# Patient Record
Sex: MALE | Race: WHITE | NOT HISPANIC OR LATINO | Employment: OTHER | URBAN - METROPOLITAN AREA
[De-identification: names, ages, dates, MRNs, and addresses within clinical notes are randomized per-mention and may not be internally consistent; named-entity substitution may affect disease eponyms.]

---

## 2021-03-30 ENCOUNTER — TELEPHONE (OUTPATIENT)
Dept: NEUROSURGERY | Facility: CLINIC | Age: 61
End: 2021-03-30

## 2021-04-06 ENCOUNTER — TELEPHONE (OUTPATIENT)
Dept: NEUROSURGERY | Facility: CLINIC | Age: 61
End: 2021-04-06

## 2021-04-07 ENCOUNTER — OFFICE VISIT (OUTPATIENT)
Dept: NEUROSURGERY | Facility: CLINIC | Age: 61
End: 2021-04-07
Payer: OTHER GOVERNMENT

## 2021-04-07 VITALS
HEART RATE: 67 BPM | SYSTOLIC BLOOD PRESSURE: 121 MMHG | DIASTOLIC BLOOD PRESSURE: 82 MMHG | WEIGHT: 227.06 LBS | BODY MASS INDEX: 33.53 KG/M2

## 2021-04-07 DIAGNOSIS — M47.22 CERVICAL SPONDYLOSIS WITH RADICULOPATHY: ICD-10-CM

## 2021-04-07 DIAGNOSIS — M50.20 HERNIATED CERVICAL INTERVERTEBRAL DISC: Primary | ICD-10-CM

## 2021-04-07 PROCEDURE — 99204 PR OFFICE/OUTPT VISIT, NEW, LEVL IV, 45-59 MIN: ICD-10-PCS | Mod: S$PBB,,, | Performed by: NEUROLOGICAL SURGERY

## 2021-04-07 PROCEDURE — 99999 PR PBB SHADOW E&M-EST. PATIENT-LVL IV: CPT | Mod: PBBFAC,,, | Performed by: NEUROLOGICAL SURGERY

## 2021-04-07 PROCEDURE — 99214 OFFICE O/P EST MOD 30 MIN: CPT | Mod: PBBFAC | Performed by: NEUROLOGICAL SURGERY

## 2021-04-07 PROCEDURE — 99204 OFFICE O/P NEW MOD 45 MIN: CPT | Mod: S$PBB,,, | Performed by: NEUROLOGICAL SURGERY

## 2021-04-07 PROCEDURE — 99999 PR PBB SHADOW E&M-EST. PATIENT-LVL IV: ICD-10-PCS | Mod: PBBFAC,,, | Performed by: NEUROLOGICAL SURGERY

## 2021-04-14 ENCOUNTER — TELEPHONE (OUTPATIENT)
Dept: PAIN MEDICINE | Facility: CLINIC | Age: 61
End: 2021-04-14

## 2021-04-26 ENCOUNTER — TELEPHONE (OUTPATIENT)
Dept: PAIN MEDICINE | Facility: CLINIC | Age: 61
End: 2021-04-26

## 2021-04-27 ENCOUNTER — TELEPHONE (OUTPATIENT)
Dept: PAIN MEDICINE | Facility: CLINIC | Age: 61
End: 2021-04-27

## 2021-04-28 DIAGNOSIS — M47.22 CERVICAL SPONDYLOSIS WITH RADICULOPATHY: Primary | ICD-10-CM

## 2021-04-29 ENCOUNTER — TELEPHONE (OUTPATIENT)
Dept: PAIN MEDICINE | Facility: CLINIC | Age: 61
End: 2021-04-29

## 2021-05-04 ENCOUNTER — TELEPHONE (OUTPATIENT)
Dept: NEUROSURGERY | Facility: CLINIC | Age: 61
End: 2021-05-04

## 2021-05-06 PROBLEM — M54.12 CERVICAL RADICULOPATHY: Status: ACTIVE | Noted: 2021-05-06

## 2021-05-10 ENCOUNTER — TELEPHONE (OUTPATIENT)
Dept: PAIN MEDICINE | Facility: CLINIC | Age: 61
End: 2021-05-10

## 2021-05-10 DIAGNOSIS — M47.22 CERVICAL SPONDYLOSIS WITH RADICULOPATHY: Primary | ICD-10-CM

## 2021-05-10 DIAGNOSIS — Z11.59 SCREENING FOR VIRAL DISEASE: ICD-10-CM

## 2021-05-20 ENCOUNTER — TELEPHONE (OUTPATIENT)
Dept: NEUROSURGERY | Facility: CLINIC | Age: 61
End: 2021-05-20

## 2021-06-01 ENCOUNTER — TELEPHONE (OUTPATIENT)
Dept: PAIN MEDICINE | Facility: CLINIC | Age: 61
End: 2021-06-01

## 2021-06-03 ENCOUNTER — TELEPHONE (OUTPATIENT)
Dept: PAIN MEDICINE | Facility: CLINIC | Age: 61
End: 2021-06-03

## 2021-06-03 DIAGNOSIS — M54.12 CERVICAL RADICULOPATHY: Primary | ICD-10-CM

## 2021-06-15 ENCOUNTER — TELEPHONE (OUTPATIENT)
Dept: PAIN MEDICINE | Facility: CLINIC | Age: 61
End: 2021-06-15

## 2021-06-16 ENCOUNTER — TELEPHONE (OUTPATIENT)
Dept: PAIN MEDICINE | Facility: CLINIC | Age: 61
End: 2021-06-16

## 2021-06-24 ENCOUNTER — TELEPHONE (OUTPATIENT)
Dept: PAIN MEDICINE | Facility: CLINIC | Age: 61
End: 2021-06-24

## 2021-09-14 ENCOUNTER — TELEPHONE (OUTPATIENT)
Dept: NEUROSURGERY | Facility: CLINIC | Age: 61
End: 2021-09-14

## 2021-09-22 ENCOUNTER — OFFICE VISIT (OUTPATIENT)
Dept: NEUROSURGERY | Facility: CLINIC | Age: 61
End: 2021-09-22
Payer: OTHER GOVERNMENT

## 2021-09-22 ENCOUNTER — TELEPHONE (OUTPATIENT)
Dept: NEUROSURGERY | Facility: CLINIC | Age: 61
End: 2021-09-22

## 2021-09-22 VITALS
DIASTOLIC BLOOD PRESSURE: 92 MMHG | HEART RATE: 82 BPM | BODY MASS INDEX: 32.58 KG/M2 | SYSTOLIC BLOOD PRESSURE: 137 MMHG | WEIGHT: 227.06 LBS

## 2021-09-22 DIAGNOSIS — M47.22 CERVICAL SPONDYLOSIS WITH RADICULOPATHY: Primary | ICD-10-CM

## 2021-09-22 DIAGNOSIS — M47.22 CERVICAL SPONDYLOSIS WITH RADICULOPATHY: ICD-10-CM

## 2021-09-22 DIAGNOSIS — M50.20 HERNIATED CERVICAL INTERVERTEBRAL DISC: ICD-10-CM

## 2021-09-22 DIAGNOSIS — M50.20 HERNIATED CERVICAL INTERVERTEBRAL DISC: Primary | ICD-10-CM

## 2021-09-22 PROCEDURE — 99999 PR PBB SHADOW E&M-EST. PATIENT-LVL III: ICD-10-PCS | Mod: PBBFAC,,, | Performed by: NEUROLOGICAL SURGERY

## 2021-09-22 PROCEDURE — 99213 OFFICE O/P EST LOW 20 MIN: CPT | Mod: PBBFAC | Performed by: NEUROLOGICAL SURGERY

## 2021-09-22 PROCEDURE — 99999 PR PBB SHADOW E&M-EST. PATIENT-LVL III: CPT | Mod: PBBFAC,,, | Performed by: NEUROLOGICAL SURGERY

## 2021-09-22 PROCEDURE — 99214 PR OFFICE/OUTPT VISIT, EST, LEVL IV, 30-39 MIN: ICD-10-PCS | Mod: S$PBB,,, | Performed by: NEUROLOGICAL SURGERY

## 2021-09-22 PROCEDURE — 99214 OFFICE O/P EST MOD 30 MIN: CPT | Mod: S$PBB,,, | Performed by: NEUROLOGICAL SURGERY

## 2021-09-23 ENCOUNTER — TELEPHONE (OUTPATIENT)
Dept: NEUROSURGERY | Facility: CLINIC | Age: 61
End: 2021-09-23

## 2021-09-24 ENCOUNTER — TELEPHONE (OUTPATIENT)
Dept: NEUROSURGERY | Facility: CLINIC | Age: 61
End: 2021-09-24

## 2021-10-04 ENCOUNTER — TELEPHONE (OUTPATIENT)
Dept: NEUROSURGERY | Facility: CLINIC | Age: 61
End: 2021-10-04

## 2021-10-05 ENCOUNTER — TELEPHONE (OUTPATIENT)
Dept: NEUROSURGERY | Facility: CLINIC | Age: 61
End: 2021-10-05

## 2021-10-07 ENCOUNTER — TELEPHONE (OUTPATIENT)
Dept: NEUROSURGERY | Facility: CLINIC | Age: 61
End: 2021-10-07

## 2021-10-25 ENCOUNTER — TELEPHONE (OUTPATIENT)
Dept: NEUROSURGERY | Facility: CLINIC | Age: 61
End: 2021-10-25
Payer: OTHER GOVERNMENT

## 2021-11-03 ENCOUNTER — TELEPHONE (OUTPATIENT)
Dept: NEUROSURGERY | Facility: CLINIC | Age: 61
End: 2021-11-03
Payer: OTHER GOVERNMENT

## 2021-11-03 DIAGNOSIS — M50.00 INTERVERTEBRAL CERVICAL DISC DISORDER WITH MYELOPATHY, CERVICAL REGION: Primary | ICD-10-CM

## 2021-11-30 ENCOUNTER — TELEPHONE (OUTPATIENT)
Dept: NEUROSURGERY | Facility: CLINIC | Age: 61
End: 2021-11-30
Payer: OTHER GOVERNMENT

## 2022-04-28 DIAGNOSIS — U07.1 COVID-19 VIRUS DETECTED: ICD-10-CM

## 2022-08-23 ENCOUNTER — TELEPHONE (OUTPATIENT)
Dept: NEUROSURGERY | Facility: CLINIC | Age: 62
End: 2022-08-23
Payer: OTHER GOVERNMENT

## 2022-08-23 NOTE — TELEPHONE ENCOUNTER
Spoke with patient wife and have been told that they prefer to have surgery closer to home ?   Patient lives currently in [facility] .   Will speak with Ms Vivian for advisement and contact tomorrow as instructed.   Patient wife is aware and seems happy with plan.     ----- Message from Meena Ramos sent at 8/23/2022  2:33 PM CDT -----  Regarding: Appt  Contact: Wife @ [phone]  Message is for Lizeth, missed call and asking to a return call to get appt

## 2022-08-23 NOTE — TELEPHONE ENCOUNTER
Called patient ,wife was unavailable . [facility] requesting a call back to assist with scheduling.          ----- Message from Cat Woodall sent at 8/23/2022  9:39 AM CDT -----  Good morning,     Thompson wife called back to schedule him for surgery. I have scanned the referral and records into media mgr.      can be reached at [phone].     Thank you,   Cat Smith

## 2022-08-24 ENCOUNTER — TELEPHONE (OUTPATIENT)
Dept: NEUROSURGERY | Facility: CLINIC | Age: 62
End: 2022-08-24
Payer: OTHER GOVERNMENT

## 2022-08-24 NOTE — TELEPHONE ENCOUNTER
Returned pt's call Explained it has been too long since they canceled surgery - pt will need an appt and poss further imaging.    Will discuss with Cintia and proceed.    ----- Message from Meena Ramos sent at 8/24/2022  9:19 AM CDT -----  Regarding: Surgery  Contact: pt @ [phone]  Pt's wife is calling to get surgery date. Asking for a call back

## 2022-09-01 ENCOUNTER — TELEPHONE (OUTPATIENT)
Dept: NEUROSURGERY | Facility: CLINIC | Age: 62
End: 2022-09-01
Payer: OTHER GOVERNMENT

## 2022-09-01 NOTE — TELEPHONE ENCOUNTER
Spoke with patient wife and patient is looking to reestablish care as she states they had to postpone surgery due to transportation issues .  Patient was last seen a year ago, and requests f/u appt before booking.   Will be scheduling patient as requested to reassess .   Patient is happy with plan .             ----- Message from Momo Turner sent at 9/1/2022  9:52 AM CDT -----  Regarding: call bk from the nurse      Call back from Miss Shelton about the Pt's neck surgery    # [phone]

## 2022-09-02 ENCOUNTER — TELEPHONE (OUTPATIENT)
Dept: NEUROSURGERY | Facility: CLINIC | Age: 62
End: 2022-09-02
Payer: OTHER GOVERNMENT

## 2022-09-02 DIAGNOSIS — M50.20 HERNIATED CERVICAL INTERVERTEBRAL DISC: Primary | ICD-10-CM

## 2022-09-23 ENCOUNTER — TELEPHONE (OUTPATIENT)
Dept: NEUROSURGERY | Facility: CLINIC | Age: 62
End: 2022-09-23
Payer: OTHER GOVERNMENT

## 2022-09-23 NOTE — TELEPHONE ENCOUNTER
Called and spoke to pts wife. Made her aware of the below per Cintia. R/S appt to in person for next Friday at 1pm.    She originally wanted to go to [facility] [place] [place] but was made aware that dr velazco only sees spinal pts her in [place] as well as sx her [facility]. Camryn voiced understanding.    No further questions or concerns voiced.     ----- Message from Cintia Diehl PA-C sent at 9/23/2022 10:02 AM CDT -----  This patient not appropriate for a virtual visit. Looks like he was scheduled for in-person and rescheduled a couple of times. Per chart review, he is looking to reschedule the surgery that was cancelled last year. In order to do that, we need a current exam. Please reschedule with myself or Dr. Velazco, per patient preference. Either of us can examine him and schedule the surgery.     Thanks,   Cintia

## 2022-09-30 ENCOUNTER — OFFICE VISIT (OUTPATIENT)
Dept: NEUROSURGERY | Facility: CLINIC | Age: 62
End: 2022-09-30
Payer: OTHER GOVERNMENT

## 2022-09-30 VITALS
WEIGHT: 227.31 LBS | HEART RATE: 95 BPM | HEIGHT: 70 IN | SYSTOLIC BLOOD PRESSURE: 132 MMHG | DIASTOLIC BLOOD PRESSURE: 85 MMHG | OXYGEN SATURATION: 96 % | BODY MASS INDEX: 32.54 KG/M2

## 2022-09-30 DIAGNOSIS — M50.20 HERNIATED CERVICAL INTERVERTEBRAL DISC: Primary | ICD-10-CM

## 2022-09-30 DIAGNOSIS — M47.22 CERVICAL SPONDYLOSIS WITH RADICULOPATHY: ICD-10-CM

## 2022-09-30 PROBLEM — I25.10 CORONARY ARTERY DISEASE: Status: ACTIVE | Noted: 2022-09-30

## 2022-09-30 PROBLEM — I10 ESSENTIAL (PRIMARY) HYPERTENSION: Status: ACTIVE | Noted: 2022-09-30

## 2022-09-30 PROBLEM — H90.5 SENSORINEURAL HEARING LOSS: Status: ACTIVE | Noted: 2022-09-30

## 2022-09-30 PROBLEM — E11.65 TYPE 2 DIABETES MELLITUS WITH HYPERGLYCEMIA: Status: ACTIVE | Noted: 2022-09-30

## 2022-09-30 PROBLEM — G47.10 HYPERSOMNIA: Status: ACTIVE | Noted: 2022-09-30

## 2022-09-30 PROBLEM — F17.200 NICOTINE DEPENDENCE: Status: ACTIVE | Noted: 2022-09-30

## 2022-09-30 PROBLEM — M10.9 GOUT: Status: ACTIVE | Noted: 2022-09-30

## 2022-09-30 PROBLEM — E78.5 DYSLIPIDEMIA: Status: ACTIVE | Noted: 2022-09-30

## 2022-09-30 PROBLEM — I48.20 CHRONIC ATRIAL FIBRILLATION, UNSPECIFIED: Status: ACTIVE | Noted: 2022-09-30

## 2022-09-30 PROCEDURE — 99215 OFFICE O/P EST HI 40 MIN: CPT | Mod: PBBFAC | Performed by: PHYSICIAN ASSISTANT

## 2022-09-30 PROCEDURE — 99999 PR PBB SHADOW E&M-EST. PATIENT-LVL V: CPT | Mod: PBBFAC,,, | Performed by: PHYSICIAN ASSISTANT

## 2022-09-30 PROCEDURE — 99215 PR OFFICE/OUTPT VISIT, EST, LEVL V, 40-54 MIN: ICD-10-PCS | Mod: S$PBB,,, | Performed by: PHYSICIAN ASSISTANT

## 2022-09-30 PROCEDURE — 99999 PR PBB SHADOW E&M-EST. PATIENT-LVL V: ICD-10-PCS | Mod: PBBFAC,,, | Performed by: PHYSICIAN ASSISTANT

## 2022-09-30 PROCEDURE — 99215 OFFICE O/P EST HI 40 MIN: CPT | Mod: S$PBB,,, | Performed by: PHYSICIAN ASSISTANT

## 2022-09-30 RX ORDER — DICLOFENAC SODIUM 10 MG/G
GEL TOPICAL
COMMUNITY
Start: 2022-08-16 | End: 2023-05-21

## 2022-09-30 RX ORDER — ROPINIROLE 1 MG/1
TABLET, FILM COATED ORAL
COMMUNITY
Start: 2022-06-01 | End: 2023-06-02

## 2022-09-30 RX ORDER — ALOGLIPTIN 25 MG/1
1 TABLET, FILM COATED ORAL DAILY
COMMUNITY
Start: 2021-11-12 | End: 2022-11-13

## 2022-09-30 RX ORDER — MUPIROCIN 20 MG/G
1 OINTMENT TOPICAL
Status: CANCELLED | OUTPATIENT
Start: 2022-09-30

## 2022-09-30 RX ORDER — CHOLECALCIFEROL (VITAMIN D3) 50 MCG
50 TABLET ORAL
COMMUNITY
Start: 2022-06-01

## 2022-09-30 RX ORDER — CYCLOBENZAPRINE HCL 10 MG
1 TABLET ORAL NIGHTLY PRN
COMMUNITY
Start: 2022-08-16 | End: 2023-08-17

## 2022-09-30 RX ORDER — LANOLIN ALCOHOL/MO/W.PET/CERES
1000 CREAM (GRAM) TOPICAL
COMMUNITY
Start: 2022-04-04 | End: 2022-11-07 | Stop reason: CLARIF

## 2022-09-30 RX ORDER — GUAIFENESIN 600 MG/1
1 TABLET, EXTENDED RELEASE ORAL EVERY 12 HOURS
COMMUNITY
Start: 2022-06-01 | End: 2023-06-02

## 2022-09-30 RX ORDER — SODIUM CHLORIDE 9 MG/ML
INJECTION, SOLUTION INTRAVENOUS CONTINUOUS
Status: CANCELLED | OUTPATIENT
Start: 2022-09-30

## 2022-09-30 RX ORDER — MONTELUKAST SODIUM 10 MG/1
TABLET ORAL
COMMUNITY
Start: 2022-02-22 | End: 2023-02-02

## 2022-09-30 RX ORDER — MUPIROCIN 20 MG/G
OINTMENT TOPICAL
Status: CANCELLED | OUTPATIENT
Start: 2022-09-30

## 2022-09-30 NOTE — LETTER
September 30, 2022    Amarjit Hernandez  [address] [place] [address]         [facility] - Neurosurgery  [address]  [place] [address]  Phone: [phone]  Fax: [phone] September 30, 2022     Patient: Amarjit Hernandez   YOB: 1960   Date of Visit: 9/30/2022       To Whom It May Concern:    It is my medical opinion that Amarjit Hernandez should be excused from jury duty due to pending cervical fusion with Dr. Velazco due to cervical radiculopathy.    If you have any questions or concerns, please don't hesitate to call.    Sincerely,        Cintia Diehl PA-C

## 2022-09-30 NOTE — PROGRESS NOTES
[facility]  Neurosurgery    SUBJECTIVE:     History of Present Illness:  Amarjit Hernandez is a 62 y.o. male with h/o C5-6 ACDF after where in 2013 who was found to have adjacent level degeneration C6-7 corresponding left-sided cervical radiculopathy in 2021.  Went ANGIE with no lasting relief.  Was scheduled for surgery at the end of 2021 but had to repeatedly rescheduled due to cost and transportation.  He is hopeful to proceed with surgery in the near future.  Currently, he complains severe 9-10/10 pain at night that prevents him from sleeping on his left side.  He has 24/7 neck pain that radiates down his left arm to the wrist, occasionally into the fingers.  He notes numbness in the palm of his left hand is intermittent and can occur at any time day or night.  He has been dropping items from his left hand.  He has gait disturbance that he attributes to poor vision due to the need for cataract surgery.  He reports multiple falls as result and has multiple bruises in small cuts on his b/l shins.    Currently takes Norco 7.5/325mg and ibuprofen 600mg for pain    Takes ASA 81 and fish oil daily  He is a h/o AFib and sleep apnea on CPAP  Is a former smoker, quit 3 months ago  Denies swallowing difficulty      (Not in a hospital admission)      Review of patient's allergies indicates:   Allergen Reactions    Latex, natural rubber Swelling       Past Medical History:   Diagnosis Date    Allergy     Chronic neck pain     Coronary artery disease     Diabetes mellitus type II     Hyperlipidemia     Hypertension     Myocardial infarction 2005    Sleep apnea      Past Surgical History:   Procedure Laterality Date    ANKLE SURGERY  1985    CORONARY ANGIOPLASTY WITH STENT PLACEMENT  2005    HERNIA REPAIR       History reviewed. No pertinent family history.  Social History     Tobacco Use    Smoking status: Every Day     Packs/day: 0.50     Years: 35.00     Pack years: 17.50     Types: Cigarettes    Smokeless  tobacco: Never   Substance Use Topics    Alcohol use: Yes     Alcohol/week: 2.0 standard drinks     Types: 1 Cans of beer, 1 Shots of liquor per week     Comment: OCCASIONALLY    Drug use: No        Review of Systems:  As noted in HPI    OBJECTIVE:     Vital Signs (Most Recent):  Pulse: 95 (09/30/22 1254)  BP: 132/85 (09/30/22 1254)  SpO2: 96 % (09/30/22 1254)    Physical Exam:  General: well developed, well nourished, no distress  Head: normocephalic, atraumatic  Neurologic: Alert and oriented. Thought content appropriate  GCS: Motor: 6/Verbal: 5/Eyes: 4 GCS Total: 15  Language: No aphasia  Speech: No dysarthria  Cranial nerves: face symmetric, tongue midline, CN II-XII grossly intact.   Eyes: pupils equal, round, reactive to light with accommodation, EOMI.   Pulmonary: normal respirations, not labored, no accessory muscles used  Sensory: intact to light touch throughout  Motor Strength: Moves all extremities spontaneously with good tone.   Strength  Deltoids Triceps Biceps Wrist Extension Wrist Flexion Hand  FA   Upper: R 5/5 5/5 5/5 5/5 5/5 5/5 5/5    L 5/5 4/5 5/5 5/5 4/5 5-/5 4/5     Iliopsoas   Tibialis  anterior Gastro- cnemius     Lower: R 5/5   5/5 5/5      L 5/5   5/5 5/5       Catherine: absent  Clonus: absent  Skin: warm, dry and intact, no rashes  Gait: normal           Diagnostic Results:  I have personally reviewed imaging and agree with the findings.     MRI cervical spine 09/2022  1. Multilevel cervical spondylosis.  Mild C5-C6 and mild/moderate C6-C7 spinal canal stenosis.  Multilevel neural foraminal narrowing most prominent at C5-C6 and C6-C7.  ---severe left and mild right foraminal stenosis at C6-7, progressed from prior imaging  2. Postoperative changes of C5-C6 ACDF.  3. No definite abnormal cord signal however motion artifact at C5-C6 and C6-C7 limits assessment.    ASSESSMENT/PLAN:     Amarjit Hernandez is a 62 y.o. male who presents with left-sided cervical radiculopathy due to  left foraminal stenosis at C6-7.  He has failed ANGIE.  He does have corresponding weakness on exam and would benefit from cervical decompression and fusion at C6-7.  He was booked for this surgery at the end of last year but was forced to cancel due to transportation and cost.  He wishes to reschedule at the next available date.      -C6-7 ACDF case request ordered for 11/21/2022 at [facility] with Dr. Velazco   -Orders in   -PAT visit requested new line will need card clean [facility]  -Will require holding ASA and fish oil 5 days prior to surgery in 10 days after surgery  -Will need to stop all NSAIDs 5 days prior to surgery and hold for at least 2 months after surgery  -consents to be signed the day of surgery    Please feel free to call with any further questions        Cintia Diehl PA-C  [facility]  Department of Neurosurgery  [phone]    Disclaimer: This note was dictated by speech recognition. Minor errors in transcription may be present.  Please call with any questions.

## 2022-11-02 ENCOUNTER — TELEPHONE (OUTPATIENT)
Dept: NEUROSURGERY | Facility: CLINIC | Age: 62
End: 2022-11-02
Payer: OTHER GOVERNMENT

## 2022-11-02 NOTE — TELEPHONE ENCOUNTER
Patient wife called and stated that she spoke with patients pcp from the [facility]. And he stated to ask who will be paying for the surgery because he did not get a new referral to neurosurgery. Patent wife wants to make sure surgery will be covered.

## 2022-11-02 NOTE — TELEPHONE ENCOUNTER
----- Message from Linda Floyd sent at 11/2/2022  1:35 PM CDT -----  Type: Patient Call Back    Who called: wife     What is the request in detail: Would like to speak with someone regarding patient's surgery on 11/21.     Can the clinic reply by [facility]? No     Would the patient rather a call back or a response via [facility]? Call back     Best call back number: [phone]

## 2022-11-07 ENCOUNTER — OFFICE VISIT (OUTPATIENT)
Dept: CARDIOLOGY | Facility: CLINIC | Age: 62
End: 2022-11-07
Payer: OTHER GOVERNMENT

## 2022-11-07 ENCOUNTER — HOSPITAL ENCOUNTER (OUTPATIENT)
Dept: RADIOLOGY | Facility: HOSPITAL | Age: 62
Discharge: HOME | End: 2022-11-07
Attending: NEUROLOGICAL SURGERY
Payer: OTHER GOVERNMENT

## 2022-11-07 ENCOUNTER — HOSPITAL ENCOUNTER (OUTPATIENT)
Dept: PREADMISSION TESTING | Facility: HOSPITAL | Age: 62
Discharge: HOME | End: 2022-11-07
Attending: NEUROLOGICAL SURGERY
Payer: OTHER GOVERNMENT

## 2022-11-07 ENCOUNTER — TELEPHONE (OUTPATIENT)
Dept: NEUROSURGERY | Facility: CLINIC | Age: 62
End: 2022-11-07
Payer: OTHER GOVERNMENT

## 2022-11-07 VITALS
SYSTOLIC BLOOD PRESSURE: 155 MMHG | WEIGHT: 235.13 LBS | HEART RATE: 75 BPM | HEIGHT: 70 IN | DIASTOLIC BLOOD PRESSURE: 53 MMHG | OXYGEN SATURATION: 93 % | RESPIRATION RATE: 18 BRPM | BODY MASS INDEX: 33.66 KG/M2

## 2022-11-07 VITALS
WEIGHT: 233.25 LBS | DIASTOLIC BLOOD PRESSURE: 94 MMHG | SYSTOLIC BLOOD PRESSURE: 162 MMHG | RESPIRATION RATE: 16 BRPM | OXYGEN SATURATION: 98 % | BODY MASS INDEX: 33.39 KG/M2 | HEIGHT: 70 IN | HEART RATE: 83 BPM | TEMPERATURE: 96 F

## 2022-11-07 DIAGNOSIS — I25.10 CORONARY ARTERY DISEASE INVOLVING NATIVE CORONARY ARTERY OF NATIVE HEART WITHOUT ANGINA PECTORIS: ICD-10-CM

## 2022-11-07 DIAGNOSIS — I48.91 ATRIAL FIBRILLATION, UNSPECIFIED TYPE: ICD-10-CM

## 2022-11-07 DIAGNOSIS — M50.20 HERNIATED CERVICAL INTERVERTEBRAL DISC: Primary | ICD-10-CM

## 2022-11-07 DIAGNOSIS — I10 ESSENTIAL (PRIMARY) HYPERTENSION: ICD-10-CM

## 2022-11-07 DIAGNOSIS — E11.69 HYPERLIPIDEMIA ASSOCIATED WITH TYPE 2 DIABETES MELLITUS: ICD-10-CM

## 2022-11-07 DIAGNOSIS — G47.33 OSA (OBSTRUCTIVE SLEEP APNEA): ICD-10-CM

## 2022-11-07 DIAGNOSIS — M54.12 CERVICAL RADICULOPATHY: ICD-10-CM

## 2022-11-07 DIAGNOSIS — E78.5 HYPERLIPIDEMIA ASSOCIATED WITH TYPE 2 DIABETES MELLITUS: ICD-10-CM

## 2022-11-07 DIAGNOSIS — Z01.810 PREOPERATIVE CARDIOVASCULAR EXAMINATION: Primary | ICD-10-CM

## 2022-11-07 DIAGNOSIS — Z01.818 PREOPERATIVE TESTING: Primary | ICD-10-CM

## 2022-11-07 LAB
ALBUMIN SERPL BCP-MCNC: 3.2 G/DL (ref 3.5–5.2)
ALP SERPL-CCNC: 52 U/L (ref 55–135)
ALT SERPL W/O P-5'-P-CCNC: 29 U/L (ref 10–44)
ANION GAP SERPL CALC-SCNC: 8 MMOL/L (ref 8–16)
APTT BLDCRRT: 30.5 SEC (ref 21–32)
AST SERPL-CCNC: 17 U/L (ref 10–40)
BASOPHILS # BLD AUTO: 0.08 K/UL (ref 0–0.2)
BASOPHILS NFR BLD: 1 % (ref 0–1.9)
BILIRUB SERPL-MCNC: 0.5 MG/DL (ref 0.1–1)
BUN SERPL-MCNC: 26 MG/DL (ref 8–23)
CALCIUM SERPL-MCNC: 9 MG/DL (ref 8.7–10.5)
CHLORIDE SERPL-SCNC: 109 MMOL/L (ref 95–110)
CO2 SERPL-SCNC: 20 MMOL/L (ref 23–29)
CREAT SERPL-MCNC: 2.3 MG/DL (ref 0.5–1.4)
DIFFERENTIAL METHOD: ABNORMAL
EOSINOPHIL # BLD AUTO: 0.5 K/UL (ref 0–0.5)
EOSINOPHIL NFR BLD: 6.4 % (ref 0–8)
ERYTHROCYTE [DISTWIDTH] IN BLOOD BY AUTOMATED COUNT: 12.4 % (ref 11.5–14.5)
EST. GFR  (NO RACE VARIABLE): 31 ML/MIN/1.73 M^2
ESTIMATED AVG GLUCOSE: 212 MG/DL (ref 68–131)
GLUCOSE SERPL-MCNC: 267 MG/DL (ref 70–110)
HBA1C MFR BLD: 9 % (ref 4–5.6)
HCT VFR BLD AUTO: 40.8 % (ref 40–54)
HGB BLD-MCNC: 13.7 G/DL (ref 14–18)
IMM GRANULOCYTES # BLD AUTO: 0.03 K/UL (ref 0–0.04)
IMM GRANULOCYTES NFR BLD AUTO: 0.4 % (ref 0–0.5)
INR PPP: 0.9 (ref 0.8–1.2)
LYMPHOCYTES # BLD AUTO: 1.9 K/UL (ref 1–4.8)
LYMPHOCYTES NFR BLD: 23.8 % (ref 18–48)
MCH RBC QN AUTO: 32.5 PG (ref 27–31)
MCHC RBC AUTO-ENTMCNC: 33.6 G/DL (ref 32–36)
MCV RBC AUTO: 97 FL (ref 82–98)
MONOCYTES # BLD AUTO: 0.7 K/UL (ref 0.3–1)
MONOCYTES NFR BLD: 8.4 % (ref 4–15)
NEUTROPHILS # BLD AUTO: 4.9 K/UL (ref 1.8–7.7)
NEUTROPHILS NFR BLD: 60 % (ref 38–73)
NRBC BLD-RTO: 0 /100 WBC
PLATELET # BLD AUTO: 186 K/UL (ref 150–450)
PMV BLD AUTO: 11 FL (ref 9.2–12.9)
POTASSIUM SERPL-SCNC: 4.5 MMOL/L (ref 3.5–5.1)
PROT SERPL-MCNC: 6.8 G/DL (ref 6–8.4)
PROTHROMBIN TIME: 10 SEC (ref 9–12.5)
RBC # BLD AUTO: 4.21 M/UL (ref 4.6–6.2)
SODIUM SERPL-SCNC: 137 MMOL/L (ref 136–145)
WBC # BLD AUTO: 8.12 K/UL (ref 3.9–12.7)

## 2022-11-07 PROCEDURE — 85025 COMPLETE CBC W/AUTO DIFF WBC: CPT | Performed by: NEUROLOGICAL SURGERY

## 2022-11-07 PROCEDURE — 93010 ELECTROCARDIOGRAM REPORT: CPT | Mod: ,,, | Performed by: INTERNAL MEDICINE

## 2022-11-07 PROCEDURE — 71046 XR CHEST PA AND LATERAL PRE-OP: ICD-10-PCS | Mod: 26,,, | Performed by: RADIOLOGY

## 2022-11-07 PROCEDURE — 99215 OFFICE O/P EST HI 40 MIN: CPT | Mod: PBBFAC,25 | Performed by: INTERNAL MEDICINE

## 2022-11-07 PROCEDURE — 93005 ELECTROCARDIOGRAM TRACING: CPT

## 2022-11-07 PROCEDURE — 85730 THROMBOPLASTIN TIME PARTIAL: CPT | Performed by: NEUROLOGICAL SURGERY

## 2022-11-07 PROCEDURE — 99204 OFFICE O/P NEW MOD 45 MIN: CPT | Mod: S$PBB,,, | Performed by: INTERNAL MEDICINE

## 2022-11-07 PROCEDURE — 71046 X-RAY EXAM CHEST 2 VIEWS: CPT | Mod: TC,FY

## 2022-11-07 PROCEDURE — 93010 EKG 12-LEAD: ICD-10-PCS | Mod: ,,, | Performed by: INTERNAL MEDICINE

## 2022-11-07 PROCEDURE — 99999 PR PBB SHADOW E&M-EST. PATIENT-LVL V: ICD-10-PCS | Mod: PBBFAC,,, | Performed by: INTERNAL MEDICINE

## 2022-11-07 PROCEDURE — 83036 HEMOGLOBIN GLYCOSYLATED A1C: CPT | Performed by: NEUROLOGICAL SURGERY

## 2022-11-07 PROCEDURE — 71046 X-RAY EXAM CHEST 2 VIEWS: CPT | Mod: 26,,, | Performed by: RADIOLOGY

## 2022-11-07 PROCEDURE — 85610 PROTHROMBIN TIME: CPT | Performed by: NEUROLOGICAL SURGERY

## 2022-11-07 PROCEDURE — 99999 PR PBB SHADOW E&M-EST. PATIENT-LVL V: CPT | Mod: PBBFAC,,, | Performed by: INTERNAL MEDICINE

## 2022-11-07 PROCEDURE — 99204 PR OFFICE/OUTPT VISIT, NEW, LEVL IV, 45-59 MIN: ICD-10-PCS | Mod: S$PBB,,, | Performed by: INTERNAL MEDICINE

## 2022-11-07 PROCEDURE — 80053 COMPREHEN METABOLIC PANEL: CPT | Performed by: NEUROLOGICAL SURGERY

## 2022-11-07 RX ORDER — CHOLECALCIFEROL (VITAMIN D3) 50 MCG
TABLET ORAL
COMMUNITY
Start: 2022-08-24 | End: 2023-06-02

## 2022-11-07 RX ORDER — LANOLIN ALCOHOL/MO/W.PET/CERES
CREAM (GRAM) TOPICAL
COMMUNITY
Start: 2022-08-16 | End: 2023-04-05

## 2022-11-07 NOTE — PROGRESS NOTES
CARDIOLOGY CONSULTATION    REASON FOR CONSULT:   Amarjit Hernandez is a 62 y.o. male who presents for preoperative cardiovascular evaluation.      HISTORY OF PRESENT ILLNESS:     Amarjit Hernandez presents for preoperative cardiovascular evaluation. Plans for cervical hardware revision. Patient had ekg today that showed atrial fibrillation. Denies any history of afib. Denies any palpitations. 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 He has a history of coronary artery disease. Sound as if he had a PCI after an emergency just after [place]. At [place]. Took plavix for at least a year. No angina. Is active. [facility]ib is [facility]. States that he has never been on anticoagulation.    CARDIOVASCULAR HISTORY:     Coronary artery disease    PAST MEDICAL HISTORY:     Past Medical History:   Diagnosis Date    Allergy     Chronic neck pain     Coronary artery disease     Diabetes mellitus type II     Hyperlipidemia     Hypertension     Myocardial infarction 2005    Sleep apnea        PAST SURGICAL HISTORY:     Past Surgical History:   Procedure Laterality Date    ANKLE SURGERY  1985    CORONARY ANGIOPLASTY WITH STENT PLACEMENT  2005    HERNIA REPAIR         ALLERGIES AND MEDICATION:     Review of patient's allergies indicates:   Allergen Reactions    Latex, natural rubber Swelling        Medication List            Accurate as of November 7, 2022  2:03 PM. If you have any questions, ask your nurse or doctor.                START taking these medications      apixaban 5 mg Tab  Commonly known as: ELIQUIS  Take 1 tablet (5 mg total) by mouth 2 (two) times daily.  Started by: Piero Cantrell MD            CONTINUE taking these medications      alogliptin 25 mg Tab  Commonly known as: NESINA     aspirin 81 MG EC tablet  Commonly known as: ECOTRIN     blood sugar diagnostic Strp     * cholecalciferol (vitamin D3) 50 mcg (2,000 unit) Tab  Commonly known as: VITAMIN D3     * cholecalciferol (vitamin D3) 50 mcg  (2,000 unit) Tab  Commonly known as: VITAMIN D3     cyanocobalamin 1000 MCG tablet  Commonly known as: VITAMIN B-12     cyclobenzaprine 10 MG tablet  Commonly known as: FLEXERIL     diclofenac sodium 1 % Gel  Commonly known as: VOLTAREN     empagliflozin 25 mg tablet  Commonly known as: JARDIANCE     fish oil-omega-3 fatty acids 300-1,000 mg capsule     gabapentin 300 mg tablet  Commonly known as: NEURONTIN     guaiFENesin 600 mg 12 hr tablet  Commonly known as: MUCINEX     metFORMIN 1000 MG tablet  Commonly known as: GLUCOPHAGE     metoprolol succinate 50 MG 24 hr tablet  Commonly known as: TOPROL-XL     montelukast 10 mg tablet  Commonly known as: SINGULAIR     multivitamin per tablet  Commonly known as: THERAGRAN     rOPINIRole 1 MG tablet  Commonly known as: REQUIP           * This list has 2 medication(s) that are the same as other medications prescribed for you. Read the directions carefully, and ask your doctor or other care provider to review them with you.                   Where to Get Your Medications        These medications were sent to [facility] [address]  [address] [address]      Phone: [phone]   apixaban 5 mg Tab         SOCIAL HISTORY:     Social History     Socioeconomic History    Marital status:    Tobacco Use    Smoking status: Former     Packs/day: 0.50     Years: 35.00     Pack years: 17.50     Types: Cigarettes     Quit date: 5/10/2022     Years since quittin.4    Smokeless tobacco: Never   Substance and Sexual Activity    Alcohol use: Yes     Alcohol/week: 2.0 standard drinks     Types: 1 Cans of beer, 1 Shots of liquor per week     Comment: OCCASIONALLY    Drug use: No    Sexual activity: Yes     Partners: Female       FAMILY HISTORY:   No family history on file.    REVIEW OF SYSTEMS:   Review of Systems   Constitutional:  Negative for chills, diaphoresis, fever, malaise/fatigue and weight loss.   Eyes:   "Negative for blurred vision and pain.   Respiratory:  Negative for sputum production, shortness of breath and wheezing.    Cardiovascular:  Negative for chest pain, palpitations, orthopnea, claudication, leg swelling and PND.   Gastrointestinal:  Negative for abdominal pain, heartburn, nausea and vomiting.   Musculoskeletal:  Positive for neck pain. Negative for back pain, falls, joint pain and myalgias.   Neurological:  Negative for dizziness, speech change, focal weakness, loss of consciousness, weakness and headaches.   Endo/Heme/Allergies:  Does not bruise/bleed easily.   Psychiatric/Behavioral:  Negative for depression, memory loss and substance abuse. The patient is not nervous/anxious.      PHYSICAL EXAM:     Vitals:    11/07/22 1324   BP: (!) 155/53   Pulse: 75   Resp: 18    Body mass index is 33.74 kg/m².  Weight: 106.7 kg (235 lb 1.9 oz)   Height: 5' 10" (177.8 cm)     Physical Exam  Vitals reviewed.   Constitutional:       General: He is not in acute distress.     Appearance: He is well-developed and overweight. He is not diaphoretic.   Neck:      Vascular: No carotid bruit or JVD.   Cardiovascular:      Rate and Rhythm: Normal rate. Rhythm irregularly irregular.      Pulses: Normal pulses.      Heart sounds: Normal heart sounds.   Pulmonary:      Effort: Pulmonary effort is normal.      Breath sounds: Normal breath sounds.   Abdominal:      General: Bowel sounds are normal.      Palpations: Abdomen is soft.      Tenderness: There is no abdominal tenderness.   Musculoskeletal:      Right lower leg: No edema.      Left lower leg: No edema.   Skin:     General: Skin is warm and dry.   Neurological:      Mental Status: He is alert and oriented to person, place, and time.   Psychiatric:         Speech: Speech normal.         Behavior: Behavior normal.         Thought Content: Thought content normal.       DATA:   EKG: (personally reviewed tracing)  11/07/2022 - Atrial fibrillation  Laboratory:  CBC:  Recent " Labs   Lab 11/07/22  1040   WBC 8.12   Hemoglobin 13.7 L   Hematocrit 40.8   Platelets 186       CHEMISTRIES:  Recent Labs   Lab 11/07/22  1040   Glucose 267 H   Sodium 137   Potassium 4.5   BUN 26 H   Creatinine 2.3 H   Calcium 9.0       CARDIAC BIOMARKERS:        COAGS:  Recent Labs   Lab 11/07/22  1040   INR 0.9       LIPIDS/LFTS:  Recent Labs   Lab 11/07/22  1040   AST 17   ALT 29       Cardiovascular Testing:      ASSESSMENT:     Preoperative cardiovascular evaluation  Atrial fibrillation: unknown duration  Coronary artery disease  Hypertension  Hyperlipidemia  Diabetes  ELAINA  Chronic kidney disease  Cervical radiculopathy    PLAN:     Preoperative cardiovascular evaluation: History of CAD. No angina. Evidence of atrial fibrillation. No history of heart failure. No known valvular heart disease. Cr 2.3. Check echo today. Afib may be chronic. Continue beta blocker perioperatively. . RCRI 6-10% 30 day risk of stroke, MI or death. Will have to hold Eliquis 2-3 days prior to procedure.  Atrial fibrillation: unknown duration: [facility] is [facility]. He has not been on anticoagulation. CHADSVasc - 2. Check echo today. Start Eliquis.  Coronary artery disease: stable. No angina.  Hypertension: Continue current management.  Hyperlipidemia: Continue current management.  Return to clinic 4-6 weeks unless echocardiogram significantly abnormal.            Piero Cantrell MD, MPH, FACC, [facility]

## 2022-11-07 NOTE — DISCHARGE INSTRUCTIONS
Before 7 AM, enter through the Emergency Entrance..   After 7 AM enter through the Main Entrance.      Your procedure  is scheduled for _11/21/2022_________.    Call [phone] between 2pm and 5pm on ___11/18/2022____to find out your arrival time for the day of surgery.    You may use the main entrance to the hospital on the [facility] side, or the entrance that is next to the [facility].    You may have one visitor.  No children allowed.     You will be going to the Same Day Surgery Unit on the 2nd floor of the hospital.    Important instructions:  Do not eat anything after midnight.  You may have plain water, non carbonated.  You may also have Gatorade or Powerade after midnight.    Stop all fluids 2 hours before your surgery.    It is okay to brush your teeth.  Do not have gum, candy or mints.    SEE MEDICATION SHEET.   TAKE MEDICATIONS AS DIRECTED WITH SIPS OF WATER.      Do not take any diabetic medication on the morning of surgery unless instructed to do so by your doctor or pre op nurse.    STOP taking Aspirin, Ibuprofen,  Advil, Motrin, Mobic(meloxicam), Aleve (naproxen), Fish oil, and Vitamin E for at least 7 days before your surgery.     You may take Tylenol if needed which is not a blood thinner.    Please shower the night before and the morning of your surgery.      Use Hibiclens soap as instructed by your pre op nurse.   Please place clean linens on your bed the night before surgery. Please wear fresh clean clothing after each shower.    No shaving of procedural area at least 4-5 days before surgery due to increased risk of skin irritation and/or possible infection.    Contact lenses and removable denture work may not be worn during your procedure.    You may wear deodorant only. If you are having breast surgery, do not wear deodorant on the operative side.    Do not wear powder, body lotion, perfume/cologne or make-up.    Do not wear any jewelry or have any metal on your body.    You will  be asked to remove any dentures or partials for the procedure.    If you are going home on the same day of surgery, you must arrange for a family member or a friend to drive you home.  Public transportation is prohibited.  You will not be able to drive home if you were given anesthesia or sedation.    Patients who want to have their Post-op prescriptions filled from our in-house [facility], bring a Credit/Debit Card or cash with you. A co-pay may be required.  The pharmacy closes at 5:30 pm.    Wear loose fitting clothes allowing for bandages.    Please leave money and valuables home.      You may bring your cell phone.    Call the doctor if fever or illness should occur before your surgery.    Call [phone] to contact us here if needed.

## 2022-11-07 NOTE — PRE-PROCEDURE INSTRUCTIONS
Patient's EKG A-fib rate of 82  Patient was told he was in A-fib a few months ago at PCP appt. Refused blood thinners. Educated patient on importance of blood thinners and stroke prevention.  Patient already has appt with Dr. Cantrell today

## 2022-11-09 ENCOUNTER — TELEPHONE (OUTPATIENT)
Dept: NEUROSURGERY | Facility: CLINIC | Age: 62
End: 2022-11-09
Payer: OTHER GOVERNMENT

## 2022-11-09 NOTE — TELEPHONE ENCOUNTER
Called wife back. Told her auth was in the system and complete. She keeps talking with [facility] people and they don't have the right info.    Assured her the referral is in the system (Media).    ----- Message from Rebekah Ballesteros NP sent at 11/9/2022 12:58 PM CST -----  Maryellen Park,    Can you call Camryn because she said the [facility] needs something stating what surgery he is having, why he needs it done etc. I told her this would come from the surgeon's office. They said they haven't received anything in order to pay for the surgery.    Thanks    Rebekah   ----- Message -----  From: Mckenna Brandon RN  Sent: 11/9/2022  12:47 PM CST  To: Rebekah Ballesteros NP    HiRebekah,    Guessing this is you.    Vivian  ----- Message -----  From: Ignacia Ruvalcaba  Sent: 11/9/2022  12:31 PM CST  To: [facility] Staff    Name Of Caller:Camryn            Provider Name:Kristopher Velazco            Does patient feel the need to be seen today?NO            Relationship to the Pt?:Wife            Contact Preference?:[phone]            What is the nature of the call?: Pt's Wife called in to return a call from nurse Welch.

## 2022-11-10 ENCOUNTER — TELEPHONE (OUTPATIENT)
Dept: CARDIOLOGY | Facility: CLINIC | Age: 62
End: 2022-11-10
Payer: OTHER GOVERNMENT

## 2022-11-10 ENCOUNTER — TELEPHONE (OUTPATIENT)
Dept: NEUROSURGERY | Facility: CLINIC | Age: 62
End: 2022-11-10
Payer: OTHER GOVERNMENT

## 2022-11-10 NOTE — TELEPHONE ENCOUNTER
----- Message from Evelyn Barrientos sent at 11/10/2022 11:46 AM CST -----  Regarding: call back  Contact: Camryn [phone]  Caller, Camryn is requesting a call in reference to Pt(wife) surgery on 11/24. Please call to discuss further.

## 2022-11-10 NOTE — TELEPHONE ENCOUNTER
Call placed to patient regarding message. JO ANN Nguyen RN  11/10/22  [id number]        ----- Message from Jenn Wright sent at 11/10/2022 10:15 AM CST -----  Name of Who is Calling: MARTHA RIOS [[id number]]            What is the request in detail: Patient is requesting orders for echo to be sent to [facility] care and [facility] asap          Fax to primary care : [phone] Carlione Burton   Call [facility] (don't have fax #):  [phone] Myla          Also needs medicine apixaban (ELIQUIS) 5 mg Tab

## 2022-11-11 ENCOUNTER — TELEPHONE (OUTPATIENT)
Dept: NEUROSURGERY | Facility: CLINIC | Age: 62
End: 2022-11-11
Payer: OTHER GOVERNMENT

## 2022-11-11 ENCOUNTER — PATIENT MESSAGE (OUTPATIENT)
Dept: NEUROSURGERY | Facility: CLINIC | Age: 62
End: 2022-11-11
Payer: OTHER GOVERNMENT

## 2022-11-11 NOTE — TELEPHONE ENCOUNTER
Called wife. Explained the surgery was canceled for now and gave her the info in writing on a message in [facility]. Advised he needs to be cleared completely before surgery. She v/u & thx.

## 2022-11-14 ENCOUNTER — TELEPHONE (OUTPATIENT)
Dept: CARDIOLOGY | Facility: CLINIC | Age: 62
End: 2022-11-14
Payer: OTHER GOVERNMENT

## 2022-11-14 NOTE — TELEPHONE ENCOUNTER
Call placed to [facility] to obtain form RFS(Request for Service). Spoke with Dany at [facility], will send form over for patient.

## 2022-12-08 ENCOUNTER — OFFICE VISIT (OUTPATIENT)
Dept: CARDIOLOGY | Facility: CLINIC | Age: 62
End: 2022-12-08
Payer: OTHER GOVERNMENT

## 2022-12-08 VITALS
OXYGEN SATURATION: 100 % | BODY MASS INDEX: 32.31 KG/M2 | SYSTOLIC BLOOD PRESSURE: 114 MMHG | WEIGHT: 225.19 LBS | RESPIRATION RATE: 18 BRPM | DIASTOLIC BLOOD PRESSURE: 86 MMHG | HEART RATE: 50 BPM

## 2022-12-08 DIAGNOSIS — Z01.810 PREOPERATIVE CARDIOVASCULAR EXAMINATION: Primary | ICD-10-CM

## 2022-12-08 DIAGNOSIS — E78.5 HYPERLIPIDEMIA ASSOCIATED WITH TYPE 2 DIABETES MELLITUS: ICD-10-CM

## 2022-12-08 DIAGNOSIS — G47.33 OSA (OBSTRUCTIVE SLEEP APNEA): ICD-10-CM

## 2022-12-08 DIAGNOSIS — N18.32 STAGE 3B CHRONIC KIDNEY DISEASE: ICD-10-CM

## 2022-12-08 DIAGNOSIS — E11.65 TYPE 2 DIABETES MELLITUS WITH HYPERGLYCEMIA, WITHOUT LONG-TERM CURRENT USE OF INSULIN: ICD-10-CM

## 2022-12-08 DIAGNOSIS — E11.69 HYPERLIPIDEMIA ASSOCIATED WITH TYPE 2 DIABETES MELLITUS: ICD-10-CM

## 2022-12-08 DIAGNOSIS — I10 ESSENTIAL (PRIMARY) HYPERTENSION: ICD-10-CM

## 2022-12-08 DIAGNOSIS — I25.10 CORONARY ARTERY DISEASE INVOLVING NATIVE CORONARY ARTERY OF NATIVE HEART WITHOUT ANGINA PECTORIS: ICD-10-CM

## 2022-12-08 DIAGNOSIS — I48.20 CHRONIC ATRIAL FIBRILLATION, UNSPECIFIED: ICD-10-CM

## 2022-12-08 DIAGNOSIS — M54.12 CERVICAL RADICULOPATHY: ICD-10-CM

## 2022-12-08 PROCEDURE — 99999 PR PBB SHADOW E&M-EST. PATIENT-LVL III: ICD-10-PCS | Mod: PBBFAC,,, | Performed by: INTERNAL MEDICINE

## 2022-12-08 PROCEDURE — 99999 PR PBB SHADOW E&M-EST. PATIENT-LVL III: CPT | Mod: PBBFAC,,, | Performed by: INTERNAL MEDICINE

## 2022-12-08 PROCEDURE — 99214 OFFICE O/P EST MOD 30 MIN: CPT | Mod: S$PBB,,, | Performed by: INTERNAL MEDICINE

## 2022-12-08 PROCEDURE — 99214 PR OFFICE/OUTPT VISIT, EST, LEVL IV, 30-39 MIN: ICD-10-PCS | Mod: S$PBB,,, | Performed by: INTERNAL MEDICINE

## 2022-12-08 PROCEDURE — 99213 OFFICE O/P EST LOW 20 MIN: CPT | Mod: PBBFAC | Performed by: INTERNAL MEDICINE

## 2022-12-08 NOTE — PROGRESS NOTES
CARDIOLOGY CLINIC VISIT        HISTORY OF PRESENT ILLNESS:     11/07/2022:Amarjit Hernandez presents for preoperative cardiovascular evaluation. Plans for cervical hardware revision. Patient had ekg today that showed atrial fibrillation. Denies any history of afib. Denies any palpitations. 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 He has a history of coronary artery disease. Sound as if he had a PCI after an emergency just after [place]. At [place]. Took plavix for at least a year. No angina. Is active. [facility] Afib is chronic. States that he has never been on anticoagulation.    12/08/2022:  Echocardiogram showed normal left ventricular systolic function.  He has plans for surgery secondary to his cervical radiculopathy.  His functional capacity is greater than 4 Mets.    CARDIOVASCULAR HISTORY:     Coronary artery disease    PAST MEDICAL HISTORY:     Past Medical History:   Diagnosis Date    Allergy     Chronic neck pain     Coronary artery disease     Diabetes mellitus type II     Hyperlipidemia     Hypertension     Myocardial infarction 2005    Sleep apnea        PAST SURGICAL HISTORY:     Past Surgical History:   Procedure Laterality Date    ANKLE SURGERY  1985    CORONARY ANGIOPLASTY WITH STENT PLACEMENT  2005    HERNIA REPAIR         ALLERGIES AND MEDICATION:     Review of patient's allergies indicates:   Allergen Reactions    Latex, natural rubber Swelling        Medication List            Accurate as of December 8, 2022  3:16 PM. If you have any questions, ask your nurse or doctor.                CONTINUE taking these medications      apixaban 5 mg Tab  Commonly known as: ELIQUIS  Take 1 tablet (5 mg total) by mouth 2 (two) times daily.     aspirin 81 MG EC tablet  Commonly known as: ECOTRIN     blood sugar diagnostic Strp     * cholecalciferol (vitamin D3) 50 mcg (2,000 unit) Tab  Commonly known as: VITAMIN D3     * cholecalciferol (vitamin D3) 50 mcg (2,000 unit) Tab  Commonly known as: VITAMIN  D3     cyanocobalamin 1000 MCG tablet  Commonly known as: VITAMIN B-12     cyclobenzaprine 10 MG tablet  Commonly known as: FLEXERIL     diclofenac sodium 1 % Gel  Commonly known as: VOLTAREN     empagliflozin 25 mg tablet  Commonly known as: JARDIANCE     fish oil-omega-3 fatty acids 300-1,000 mg capsule     gabapentin 300 mg tablet  Commonly known as: NEURONTIN     guaiFENesin 600 mg 12 hr tablet  Commonly known as: MUCINEX     metFORMIN 1000 MG tablet  Commonly known as: GLUCOPHAGE     metoprolol succinate 50 MG 24 hr tablet  Commonly known as: TOPROL-XL     montelukast 10 mg tablet  Commonly known as: SINGULAIR     multivitamin per tablet  Commonly known as: THERAGRAN     rOPINIRole 1 MG tablet  Commonly known as: REQUIP           * This list has 2 medication(s) that are the same as other medications prescribed for you. Read the directions carefully, and ask your doctor or other care provider to review them with you.                  SOCIAL HISTORY:     Social History     Socioeconomic History    Marital status:    Tobacco Use    Smoking status: Former     Packs/day: 0.50     Years: 35.00     Pack years: 17.50     Types: Cigarettes     Quit date: 5/10/2022     Years since quittin.5    Smokeless tobacco: Never   Substance and Sexual Activity    Alcohol use: Yes     Alcohol/week: 2.0 standard drinks     Types: 1 Cans of beer, 1 Shots of liquor per week     Comment: OCCASIONALLY    Drug use: No    Sexual activity: Yes     Partners: Female       FAMILY HISTORY:   No family history on file.    REVIEW OF SYSTEMS:   Review of Systems   Constitutional:  Negative for chills, diaphoresis, fever, malaise/fatigue and weight loss.   Eyes:  Negative for blurred vision and pain.   Respiratory:  Negative for sputum production, shortness of breath and wheezing.    Cardiovascular:  Negative for chest pain, palpitations, orthopnea, claudication, leg swelling and PND.   Gastrointestinal:  Negative for abdominal pain,  heartburn, nausea and vomiting.   Musculoskeletal:  Positive for neck pain. Negative for back pain, falls, joint pain and myalgias.   Neurological:  Negative for dizziness, speech change, focal weakness, loss of consciousness, weakness and headaches.   Endo/Heme/Allergies:  Does not bruise/bleed easily.   Psychiatric/Behavioral:  Negative for depression, memory loss and substance abuse. The patient is not nervous/anxious.      PHYSICAL EXAM:     Vitals:    12/08/22 1417   BP: 114/86   Pulse: (!) 50   Resp: 18    Body mass index is 32.31 kg/m².  Weight: 102.2 kg (225 lb 3.2 oz)         Physical Exam  Vitals reviewed.   Constitutional:       General: He is not in acute distress.     Appearance: He is well-developed and overweight. He is not diaphoretic.   Neck:      Vascular: No carotid bruit or JVD.   Cardiovascular:      Rate and Rhythm: Normal rate. Rhythm irregularly irregular.      Pulses: Normal pulses.      Heart sounds: Normal heart sounds.   Pulmonary:      Effort: Pulmonary effort is normal.      Breath sounds: Normal breath sounds.   Abdominal:      General: Bowel sounds are normal.      Palpations: Abdomen is soft.      Tenderness: There is no abdominal tenderness.   Musculoskeletal:      Right lower leg: No edema.      Left lower leg: No edema.   Skin:     General: Skin is warm and dry.   Neurological:      Mental Status: He is alert and oriented to person, place, and time.   Psychiatric:         Speech: Speech normal.         Behavior: Behavior normal.         Thought Content: Thought content normal.       DATA:   EKG: (personally reviewed tracing)  11/07/2022 - Atrial fibrillation  Laboratory:  CBC:  Recent Labs   Lab 11/07/22  1040   WBC 8.12   Hemoglobin 13.7 L   Hematocrit 40.8   Platelets 186       CHEMISTRIES:  Recent Labs   Lab 11/07/22  1040   Glucose 267 H   Sodium 137   Potassium 4.5   BUN 26 H   Creatinine 2.3 H   Calcium 9.0       CARDIAC BIOMARKERS:        COAGS:  Recent Labs   Lab  11/07/22  1040   INR 0.9       LIPIDS/LFTS:  Recent Labs   Lab 11/07/22  1040   AST 17   ALT 29       Cardiovascular Testing:    Echocardiogram 11/30/2022:    The left ventricle is normal in size with mild concentric hypertrophy and normal systolic function.  The estimated ejection fraction is 60%.  Normal left ventricular diastolic function.  Normal right ventricular size with normal right ventricular systolic function.  Moderate left atrial enlargement.  Mild tricuspid regurgitation.  Mild aortic regurgitation.  Mild mitral regurgitation.  Normal central venous pressure (3 mmHg).  The estimated PA systolic pressure is 20 mmHg.    ASSESSMENT:     Preoperative cardiovascular evaluation  Atrial fibrillation: unknown duration  Coronary artery disease  Hypertension  Hyperlipidemia  Diabetes  ELAINA  Chronic kidney disease  Cervical radiculopathy    PLAN:     Preoperative cardiovascular evaluation: History of CAD. No angina. Atrial fibrillation. No history of heart failure. No known valvular heart disease.  Functional capacity greater than 4 Mets.  Echocardiogram showed normal function.  Patient can proceed.  RCRI 6-10% 30 day risk of stroke, MI or death.  Continue beta-blocker perioperatively.  Atrial fibrillation:  Likely chronic.  Anticoagulation added.  Coronary artery disease: stable. No angina.  Hypertension: Continue current management.  Hyperlipidemia: Continue current management.  Return to clinic 3 months.         Piero Cantrell MD, MPH, FACC, [facility]

## 2022-12-09 ENCOUNTER — TELEPHONE (OUTPATIENT)
Dept: NEUROSURGERY | Facility: CLINIC | Age: 62
End: 2022-12-09
Payer: OTHER GOVERNMENT

## 2022-12-13 ENCOUNTER — TELEPHONE (OUTPATIENT)
Dept: NEUROSURGERY | Facility: CLINIC | Age: 62
End: 2022-12-13
Payer: OTHER GOVERNMENT

## 2022-12-13 NOTE — TELEPHONE ENCOUNTER
Returned pt's call  S/W wife. Explained that A1c needs to be below 7. Pt is working with [facility] on this - on new meds. Wife understands expectation.    ----- Message from Ilenemicheal Truong sent at 12/13/2022  1:06 PM CST -----  Regarding: Missed Call  Contact: self [phone]  Pt is calling stating he had a missed call from Mckenna in reference to his surgery is being cancelled due to his A1C.  Please call.  Pt states he doesn't access the [facility] .

## 2023-03-23 PROBLEM — M25.571 ACUTE RIGHT ANKLE PAIN: Status: ACTIVE | Noted: 2023-03-23

## 2024-05-13 PROBLEM — I48.91 ATRIAL FIBRILLATION WITH RAPID VENTRICULAR RESPONSE: Status: ACTIVE | Noted: 2024-05-13

## 2024-05-13 PROBLEM — N17.9 ACUTE RENAL FAILURE SUPERIMPOSED ON STAGE 3B CHRONIC KIDNEY DISEASE: Status: ACTIVE | Noted: 2024-05-13

## 2024-05-13 PROBLEM — J20.9 ACUTE BRONCHITIS: Status: ACTIVE | Noted: 2024-05-13

## 2024-05-13 PROBLEM — I50.9 ACUTE DECOMPENSATED HEART FAILURE: Status: ACTIVE | Noted: 2024-05-13

## 2024-05-13 PROBLEM — J44.1 COPD EXACERBATION: Status: ACTIVE | Noted: 2024-05-13

## 2024-05-13 PROBLEM — N18.32 ACUTE RENAL FAILURE SUPERIMPOSED ON STAGE 3B CHRONIC KIDNEY DISEASE: Status: ACTIVE | Noted: 2024-05-13

## 2024-05-13 PROBLEM — I27.20 PULMONARY HTN: Status: ACTIVE | Noted: 2024-05-13

## 2024-05-15 PROBLEM — I48.91 ATRIAL FIBRILLATION WITH RAPID VENTRICULAR RESPONSE: Status: RESOLVED | Noted: 2024-05-13 | Resolved: 2024-05-15

## 2024-05-15 PROBLEM — I48.20 CHRONIC ATRIAL FIBRILLATION: Status: ACTIVE | Noted: 2024-05-15

## 2024-06-04 ENCOUNTER — TELEPHONE (OUTPATIENT)
Dept: CARDIOLOGY | Facility: CLINIC | Age: 64
End: 2024-06-04
Payer: OTHER GOVERNMENT

## 2024-07-23 ENCOUNTER — TELEPHONE (OUTPATIENT)
Dept: CARDIOLOGY | Facility: CLINIC | Age: 64
End: 2024-07-23
Payer: OTHER GOVERNMENT

## 2024-07-23 NOTE — TELEPHONE ENCOUNTER
Called patient multiple times, wife picked up. They hung up on me as I was talking and introducing myself. Tried calling back with no success. Scheduled an appointment for Dr. Cantrell's soonest availability. Put a letter in the mail with appointment reminder.

## 2024-07-23 NOTE — TELEPHONE ENCOUNTER
----- Message from Iliana Ceron MA sent at 7/23/2024  9:01 AM CDT -----  Contact: [phone]  Please assist with scheduling  ----- Message -----  From: Dar Rick  Sent: 7/23/2024   8:57 AM CDT  To: Tamia Harry Staff    Type:  Patient Returning Call    Who Called:Wife of Amarjit Hernandez  Who Left Message for Patient:  Does the patient know what this is regarding?:Rescheduling missed appt  Would the patient rather a call back or a response via [facility]? Call Back  Best Call Back Number:[phone]  Additional Information: Pt may be having Afib issue right now.